# Patient Record
Sex: FEMALE | Race: ASIAN | NOT HISPANIC OR LATINO | URBAN - METROPOLITAN AREA
[De-identification: names, ages, dates, MRNs, and addresses within clinical notes are randomized per-mention and may not be internally consistent; named-entity substitution may affect disease eponyms.]

---

## 2021-12-25 ENCOUNTER — EMERGENCY (EMERGENCY)
Facility: HOSPITAL | Age: 41
LOS: 1 days | Discharge: HOME | End: 2021-12-25
Attending: EMERGENCY MEDICINE | Admitting: EMERGENCY MEDICINE
Payer: MEDICAID

## 2021-12-25 VITALS
RESPIRATION RATE: 18 BRPM | SYSTOLIC BLOOD PRESSURE: 151 MMHG | TEMPERATURE: 97 F | DIASTOLIC BLOOD PRESSURE: 94 MMHG | OXYGEN SATURATION: 100 % | HEART RATE: 75 BPM

## 2021-12-25 PROCEDURE — 99284 EMERGENCY DEPT VISIT MOD MDM: CPT

## 2021-12-25 RX ORDER — EPINEPHRINE 0.3 MG/.3ML
0.3 INJECTION INTRAMUSCULAR; SUBCUTANEOUS ONCE
Refills: 0 | Status: DISCONTINUED | OUTPATIENT
Start: 2021-12-25 | End: 2021-12-25

## 2021-12-25 NOTE — ED ADULT TRIAGE NOTE - CHIEF COMPLAINT QUOTE
pt c/o throat tightness x2 days. Pt states symptoms started after taking azithromycin and had similar symptoms last time she took same medicine. Pt speaking in full sentences breathing unlabored but intermittently gasping/heaving. Denies nausea or hest pain. Pt covid+ 12/14

## 2021-12-25 NOTE — ED ADULT NURSE NOTE - OBJECTIVE STATEMENT
42 yo F AAOx4 received to Tr-A, NAD on exam, resps even unlabored on RA, pt states shes been taking azithromycin for an ear infection and it makes her throat feel like its closing, no signs of compromise on exam, pt randomly gasps for air/burps during speech but remains clear and coherent, MD Camara at bedside for eval, pending further orders

## 2021-12-26 VITALS
DIASTOLIC BLOOD PRESSURE: 71 MMHG | SYSTOLIC BLOOD PRESSURE: 127 MMHG | HEART RATE: 71 BPM | RESPIRATION RATE: 16 BRPM | OXYGEN SATURATION: 100 % | TEMPERATURE: 98 F

## 2021-12-26 LAB
ALBUMIN SERPL ELPH-MCNC: 4.4 G/DL — SIGNIFICANT CHANGE UP (ref 3.3–5)
ALP SERPL-CCNC: 76 U/L — SIGNIFICANT CHANGE UP (ref 40–120)
ALT FLD-CCNC: 31 U/L — SIGNIFICANT CHANGE UP (ref 4–33)
ANION GAP SERPL CALC-SCNC: 12 MMOL/L — SIGNIFICANT CHANGE UP (ref 7–14)
AST SERPL-CCNC: 37 U/L — HIGH (ref 4–32)
BASOPHILS # BLD AUTO: 0.05 K/UL — SIGNIFICANT CHANGE UP (ref 0–0.2)
BASOPHILS NFR BLD AUTO: 0.8 % — SIGNIFICANT CHANGE UP (ref 0–2)
BILIRUB SERPL-MCNC: 0.3 MG/DL — SIGNIFICANT CHANGE UP (ref 0.2–1.2)
BUN SERPL-MCNC: 10 MG/DL — SIGNIFICANT CHANGE UP (ref 7–23)
CALCIUM SERPL-MCNC: 8.8 MG/DL — SIGNIFICANT CHANGE UP (ref 8.4–10.5)
CHLORIDE SERPL-SCNC: 105 MMOL/L — SIGNIFICANT CHANGE UP (ref 98–107)
CO2 SERPL-SCNC: 22 MMOL/L — SIGNIFICANT CHANGE UP (ref 22–31)
CREAT SERPL-MCNC: 0.58 MG/DL — SIGNIFICANT CHANGE UP (ref 0.5–1.3)
EOSINOPHIL # BLD AUTO: 0.06 K/UL — SIGNIFICANT CHANGE UP (ref 0–0.5)
EOSINOPHIL NFR BLD AUTO: 1 % — SIGNIFICANT CHANGE UP (ref 0–6)
FLUAV AG NPH QL: SIGNIFICANT CHANGE UP
FLUBV AG NPH QL: SIGNIFICANT CHANGE UP
GLUCOSE SERPL-MCNC: 111 MG/DL — HIGH (ref 70–99)
HCT VFR BLD CALC: 40.3 % — SIGNIFICANT CHANGE UP (ref 34.5–45)
HGB BLD-MCNC: 13 G/DL — SIGNIFICANT CHANGE UP (ref 11.5–15.5)
IANC: 2.76 K/UL — SIGNIFICANT CHANGE UP (ref 1.5–8.5)
IMM GRANULOCYTES NFR BLD AUTO: 0.3 % — SIGNIFICANT CHANGE UP (ref 0–1.5)
LYMPHOCYTES # BLD AUTO: 2.79 K/UL — SIGNIFICANT CHANGE UP (ref 1–3.3)
LYMPHOCYTES # BLD AUTO: 45.4 % — HIGH (ref 13–44)
MAGNESIUM SERPL-MCNC: 2.2 MG/DL — SIGNIFICANT CHANGE UP (ref 1.6–2.6)
MCHC RBC-ENTMCNC: 26.9 PG — LOW (ref 27–34)
MCHC RBC-ENTMCNC: 32.3 GM/DL — SIGNIFICANT CHANGE UP (ref 32–36)
MCV RBC AUTO: 83.3 FL — SIGNIFICANT CHANGE UP (ref 80–100)
MONOCYTES # BLD AUTO: 0.47 K/UL — SIGNIFICANT CHANGE UP (ref 0–0.9)
MONOCYTES NFR BLD AUTO: 7.6 % — SIGNIFICANT CHANGE UP (ref 2–14)
NEUTROPHILS # BLD AUTO: 2.76 K/UL — SIGNIFICANT CHANGE UP (ref 1.8–7.4)
NEUTROPHILS NFR BLD AUTO: 44.9 % — SIGNIFICANT CHANGE UP (ref 43–77)
NRBC # BLD: 0 /100 WBCS — SIGNIFICANT CHANGE UP
NRBC # FLD: 0 K/UL — SIGNIFICANT CHANGE UP
PHOSPHATE SERPL-MCNC: 2.7 MG/DL — SIGNIFICANT CHANGE UP (ref 2.5–4.5)
PLATELET # BLD AUTO: 166 K/UL — SIGNIFICANT CHANGE UP (ref 150–400)
POTASSIUM SERPL-MCNC: 4.6 MMOL/L — SIGNIFICANT CHANGE UP (ref 3.5–5.3)
POTASSIUM SERPL-SCNC: 4.6 MMOL/L — SIGNIFICANT CHANGE UP (ref 3.5–5.3)
PROT SERPL-MCNC: 7.2 G/DL — SIGNIFICANT CHANGE UP (ref 6–8.3)
RBC # BLD: 4.84 M/UL — SIGNIFICANT CHANGE UP (ref 3.8–5.2)
RBC # FLD: 13.7 % — SIGNIFICANT CHANGE UP (ref 10.3–14.5)
RSV RNA NPH QL NAA+NON-PROBE: SIGNIFICANT CHANGE UP
SARS-COV-2 RNA SPEC QL NAA+PROBE: DETECTED
SODIUM SERPL-SCNC: 139 MMOL/L — SIGNIFICANT CHANGE UP (ref 135–145)
WBC # BLD: 6.15 K/UL — SIGNIFICANT CHANGE UP (ref 3.8–10.5)
WBC # FLD AUTO: 6.15 K/UL — SIGNIFICANT CHANGE UP (ref 3.8–10.5)

## 2021-12-26 PROCEDURE — 71045 X-RAY EXAM CHEST 1 VIEW: CPT | Mod: 26

## 2021-12-26 PROCEDURE — 70360 X-RAY EXAM OF NECK: CPT | Mod: 26

## 2021-12-26 RX ORDER — DEXAMETHASONE 0.5 MG/5ML
6 ELIXIR ORAL ONCE
Refills: 0 | Status: COMPLETED | OUTPATIENT
Start: 2021-12-26 | End: 2021-12-26

## 2021-12-26 RX ORDER — FAMOTIDINE 10 MG/ML
20 INJECTION INTRAVENOUS ONCE
Refills: 0 | Status: COMPLETED | OUTPATIENT
Start: 2021-12-26 | End: 2021-12-26

## 2021-12-26 RX ORDER — SODIUM CHLORIDE 9 MG/ML
1000 INJECTION INTRAMUSCULAR; INTRAVENOUS; SUBCUTANEOUS ONCE
Refills: 0 | Status: COMPLETED | OUTPATIENT
Start: 2021-12-26 | End: 2021-12-26

## 2021-12-26 RX ORDER — DIPHENHYDRAMINE HCL 50 MG
50 CAPSULE ORAL ONCE
Refills: 0 | Status: COMPLETED | OUTPATIENT
Start: 2021-12-26 | End: 2021-12-26

## 2021-12-26 RX ADMIN — Medication 6 MILLIGRAM(S): at 00:33

## 2021-12-26 RX ADMIN — FAMOTIDINE 20 MILLIGRAM(S): 10 INJECTION INTRAVENOUS at 00:33

## 2021-12-26 RX ADMIN — SODIUM CHLORIDE 1000 MILLILITER(S): 9 INJECTION INTRAMUSCULAR; INTRAVENOUS; SUBCUTANEOUS at 00:32

## 2021-12-26 RX ADMIN — Medication 50 MILLIGRAM(S): at 00:33

## 2021-12-26 RX ADMIN — Medication 1 MILLIGRAM(S): at 01:37

## 2021-12-26 NOTE — ED PROVIDER NOTE - PROGRESS NOTE DETAILS
Sister over the phone stating "she doesn't need blood work, she just needs help". Pt later agreeable to blood work. Will trial meds and reassess. Obi Marti, EM PGY3 ENT consulted - no findings on scope. Pt no longer gasping after ativan. Likely anxiety symptoms. Will monitor and give SLP follow up per ENT reccs. LOGAN Toussaint PGY3

## 2021-12-26 NOTE — ED PROVIDER NOTE - CLINICAL SUMMARY MEDICAL DECISION MAKING FREE TEXT BOX
41F presenting with CC of trouble breathing. On exam VSS w normal RR and O2 sat. Pt appears to be anxious. will obtain necessary labs and imaging and trial meds, reassess. LOAGN Toussaint PGY3

## 2021-12-26 NOTE — CONSULT NOTE ADULT - SUBJECTIVE AND OBJECTIVE BOX
HPI:  Patient is a 41y Female with no PMH p/w throat discomfort, episodes of gasping for air. Reports she was diagnosed with ear infection 3 days ago at OSH, was given azithromycin. Also was positive for COVID. She returned to OSH and was admitted o/n for SOB. Patient dced home but continues to have sx. In ED pt noted to have intermittent gasping sounds, although able to speak in full sentences. Reports intermittent throat tightness and the feeling as if something is stuck/squeezing her throat. No fevers, chest pain. Pt received benadryl, epi, pepcid, decadron, benzos in ED. Upon eval by ENT sx resolved. She reports similar sx one year ago, also in the setting of abx use.    Physical Exam  T(C): 36.1 (12-25-21 @ 22:39), Max: 36.1 (12-25-21 @ 22:39)  HR: 75 (12-25-21 @ 22:39) (75 - 75)  BP: 151/94 (12-25-21 @ 22:39) (151/94 - 151/94)  RR: 18 (12-25-21 @ 22:39) (18 - 18)  SpO2: 100% (12-25-21 @ 22:39) (100% - 100%)  General: NAD, A+Ox3  Resp: No respiratory distress, stridor, or stertor  Voice quality: normal  Face:  Symmetric without masses or lesions  OU: EOMI  OC/OP: tongue normal, floor of mouth wnl, no masses or lesions, OP clear  Neck: soft/flat, no LAD  CNII-XII intact    LARYNGOSCOPY EXAM:     Verbal consent was obtained from patient prior to procedure.    Indication: throat discomfort    Anesthesia: Afrin spray was applied to the nasal cavities.    Flexible laryngoscopy was performed and revealed the following:    Nasopharynx had no mass or exudate.    Base of tongue was symmetric and not enlarged.    Vallecula was clear    Epiglottis, both aryepiglottic folds and both false vocal folds were normal    Arytenoids both without edema and erythema     True vocal folds were fully mobile and without lesions.     Post cricoid area was clear    Interarytenoid edema was absent    The patient tolerated the procedure well.      A/P: 41y Female no PMH p/w throat discomfort and subjective difficulty breathing. PE and FOE wnl, although paradoxical VC motion cannot be ruled out as pt not having episode at time of evaluation.   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

## 2021-12-26 NOTE — ED PROVIDER NOTE - OBJECTIVE STATEMENT
41F w no significant pmhx presenting with CC of gasping for air. Patient states that she is fully covid vaccinated and got the booster on Monday. Started having ear pain on thursday - went to [facility] was diagnosed w ear infection and started on azithromycin. Also tested positive for covid. Pt went back to [facility] yesterday w overnight stay for monitoring for SOB. Dced home without any actionable findings at [facility] but problem has not resolved. No f/c, cough, n/v, chest pain. Pt tearful during encounter but not dyspneic - able to speak in full sentences.

## 2021-12-26 NOTE — ED PROVIDER NOTE - PHYSICAL EXAMINATION
Const: Well-nourished, Well-developed, appearing stated age. Distressed.   Eyes: no conjunctival injection, and symmetrical lids.  HEENT: Head NCAT, no lesions. Atraumatic external nose and ears.   Neck: Symmetric, trachea midline.   CVS: +S1/S2, Radial and DP pulses 2+ b/l.   RESP: Unlabored respiratory effort. Clear to auscultation bilaterally.  GI: Nontender/Nondistended, No CVA tenderness b/l.   MSK: Normocephalic/Atraumatic, Lower Extremities w/o edema b/l.   Skin: Warm, dry and intact.   Neuro: Fluent Speech. Motor & Sensation grossly intact.  Psych: Awake, Alert, & Oriented (AAO) x3. Appropriate mood and affect. Const: Well-nourished, Well-developed, appearing stated age. Distressed.   Eyes: no conjunctival injection, and symmetrical lids.  HEENT: Head NCAT, no lesions. Atraumatic external nose and ears. No oropharyngeal edema, uvula midline.  Neck: Symmetric, trachea midline. No stridor.   CVS: +S1/S2, Radial and DP pulses 2+ b/l.   RESP: Unlabored respiratory effort. Clear to auscultation bilaterally.  GI: Nontender/Nondistended, No CVA tenderness b/l.   MSK: Normocephalic/Atraumatic, Lower Extremities w/o edema b/l.   Skin: Warm, dry and intact.   Neuro: Fluent Speech. Motor & Sensation grossly intact.  Psych: Awake, Alert, & Oriented (AAO) x3. Appropriate mood and affect.

## 2021-12-26 NOTE — ED PROVIDER NOTE - PATIENT PORTAL LINK FT
You can access the FollowMyHealth Patient Portal offered by [facility] by registering at the following website: [email or web address]. By joining "[employer]’s FollowMyHealth portal, you will also be able to view your health information using other applications (apps) compatible with our system.

## 2021-12-26 NOTE — ED PROVIDER NOTE - ATTENDING CONTRIBUTION TO CARE
agree with resident note    "41F w no significant pmhx presenting with CC of gasping for air. Patient states that she is fully covid vaccinated and got the booster on Monday. Started having ear pain on thursday - went to [facility] was diagnosed w ear infection and started on azithromycin. Also tested positive for covid. Pt went back to [facility] yesterday w overnight stay for monitoring for SOB. Dced home without any actionable findings at [facility] but problem has not resolved. No f/c, cough, n/v, chest pain. Pt tearful during encounter but not dyspneic - able to speak in full sentences."    pt at times gasping for air.  Then speaking clearly with no evidence of resp compromise.  Speaking with sister states this has been ongoing and worse since yesterday.    PE: well appearing and then gasping for air; no trismus; no stridor; CTAB/L; s1 s2 no m/r/g abd soft/NT/ND    Imp: globus?  initially treated for allergic reaction; hx not consistent; will still have ENT scope; treat with ativan for possible anxiety cause

## 2021-12-26 NOTE — ED PROVIDER NOTE - NSFOLLOWUPINSTRUCTIONS_ED_ALL_ED_FT
Please see attached information on anxiety.     Return to the ER for new or worsening symptoms.      your medications and take as directed.     The number for our walk in clinic is these episodes return is [phone].

## 2021-12-26 NOTE — ED PROVIDER NOTE - NS ED ROS FT
CONST: no fevers, no chills, no trauma  EYES: no pain, no blurry vision   ENT: no sore throat, no epistaxis, no rhinorrhea  CV: no chest pain, no palpitations, no orthopnea, no extremity pain or swelling  RESP: + shortness of breath, no cough, no sputum, no pleurisy, no wheezing  ABD: no abdominal pain, no nausea, no vomiting, no diarrhea, no black or bloody stool  : no dysuria, no hematuria, no frequency, no urgency  MSK: no back pain, no neck pain, no extremity pain  NEURO: no headache, no sensory disturbances, no focal weakness, no dizziness  HEME: no easy bleeding or bruising  SKIN: no diaphoresis, no rash

## 2021-12-26 NOTE — ED ADULT NURSE REASSESSMENT NOTE - NS ED NURSE REASSESS COMMENT FT1
pt only gasping/burping when at rest (not during speech), pt sister remains on phone stating "you guys need to help her, her throat is closing," no signs of distress/compromise/edema, medicated with ativan for anxiety, will reassess

## 2022-12-14 ENCOUNTER — EMERGENCY (EMERGENCY)
Facility: HOSPITAL | Age: 42
LOS: 1 days | Discharge: HOME | End: 2022-12-14
Attending: EMERGENCY MEDICINE | Admitting: EMERGENCY MEDICINE
Payer: COMMERCIAL

## 2022-12-14 VITALS
TEMPERATURE: 98 F | OXYGEN SATURATION: 100 % | DIASTOLIC BLOOD PRESSURE: 76 MMHG | WEIGHT: 171.08 LBS | SYSTOLIC BLOOD PRESSURE: 112 MMHG | HEART RATE: 74 BPM | RESPIRATION RATE: 18 BRPM

## 2022-12-14 VITALS
TEMPERATURE: 98 F | SYSTOLIC BLOOD PRESSURE: 110 MMHG | RESPIRATION RATE: 16 BRPM | OXYGEN SATURATION: 100 % | DIASTOLIC BLOOD PRESSURE: 60 MMHG | HEART RATE: 71 BPM

## 2022-12-14 PROBLEM — Z78.9 OTHER SPECIFIED HEALTH STATUS: Chronic | Status: ACTIVE | Noted: 2021-12-26

## 2022-12-14 LAB
ALBUMIN SERPL ELPH-MCNC: 4 G/DL — SIGNIFICANT CHANGE UP (ref 3.3–5)
ALP SERPL-CCNC: 89 U/L — SIGNIFICANT CHANGE UP (ref 40–120)
ALT FLD-CCNC: 56 U/L — SIGNIFICANT CHANGE UP (ref 12–78)
ANION GAP SERPL CALC-SCNC: 4 MMOL/L — LOW (ref 5–17)
AST SERPL-CCNC: 24 U/L — SIGNIFICANT CHANGE UP (ref 15–37)
BASOPHILS # BLD AUTO: 0.1 K/UL — SIGNIFICANT CHANGE UP (ref 0–0.2)
BASOPHILS NFR BLD AUTO: 1.2 % — SIGNIFICANT CHANGE UP (ref 0–2)
BILIRUB SERPL-MCNC: 0.2 MG/DL — SIGNIFICANT CHANGE UP (ref 0.2–1.2)
BUN SERPL-MCNC: 8 MG/DL — SIGNIFICANT CHANGE UP (ref 7–23)
CALCIUM SERPL-MCNC: 8.9 MG/DL — SIGNIFICANT CHANGE UP (ref 8.5–10.1)
CHLORIDE SERPL-SCNC: 109 MMOL/L — HIGH (ref 96–108)
CO2 SERPL-SCNC: 29 MMOL/L — SIGNIFICANT CHANGE UP (ref 22–31)
CREAT SERPL-MCNC: 0.51 MG/DL — SIGNIFICANT CHANGE UP (ref 0.5–1.3)
EGFR: 121 ML/MIN/1.73M2 — SIGNIFICANT CHANGE UP
EOSINOPHIL # BLD AUTO: 0.17 K/UL — SIGNIFICANT CHANGE UP (ref 0–0.5)
EOSINOPHIL NFR BLD AUTO: 2 % — SIGNIFICANT CHANGE UP (ref 0–6)
GLUCOSE SERPL-MCNC: 110 MG/DL — HIGH (ref 70–99)
HCT VFR BLD CALC: 40.3 % — SIGNIFICANT CHANGE UP (ref 34.5–45)
HGB BLD-MCNC: 13.8 G/DL — SIGNIFICANT CHANGE UP (ref 11.5–15.5)
IMM GRANULOCYTES NFR BLD AUTO: 0.5 % — SIGNIFICANT CHANGE UP (ref 0–0.9)
LYMPHOCYTES # BLD AUTO: 2.29 K/UL — SIGNIFICANT CHANGE UP (ref 1–3.3)
LYMPHOCYTES # BLD AUTO: 27.6 % — SIGNIFICANT CHANGE UP (ref 13–44)
MCHC RBC-ENTMCNC: 28 PG — SIGNIFICANT CHANGE UP (ref 27–34)
MCHC RBC-ENTMCNC: 34.2 GM/DL — SIGNIFICANT CHANGE UP (ref 32–36)
MCV RBC AUTO: 81.7 FL — SIGNIFICANT CHANGE UP (ref 80–100)
MONOCYTES # BLD AUTO: 0.51 K/UL — SIGNIFICANT CHANGE UP (ref 0–0.9)
MONOCYTES NFR BLD AUTO: 6.1 % — SIGNIFICANT CHANGE UP (ref 2–14)
NEUTROPHILS # BLD AUTO: 5.19 K/UL — SIGNIFICANT CHANGE UP (ref 1.8–7.4)
NEUTROPHILS NFR BLD AUTO: 62.6 % — SIGNIFICANT CHANGE UP (ref 43–77)
NRBC # BLD: 0 /100 WBCS — SIGNIFICANT CHANGE UP (ref 0–0)
PLATELET # BLD AUTO: 237 K/UL — SIGNIFICANT CHANGE UP (ref 150–400)
POTASSIUM SERPL-MCNC: 4 MMOL/L — SIGNIFICANT CHANGE UP (ref 3.5–5.3)
POTASSIUM SERPL-SCNC: 4 MMOL/L — SIGNIFICANT CHANGE UP (ref 3.5–5.3)
PROT SERPL-MCNC: 7.3 G/DL — SIGNIFICANT CHANGE UP (ref 6–8.3)
RBC # BLD: 4.93 M/UL — SIGNIFICANT CHANGE UP (ref 3.8–5.2)
RBC # FLD: 13.2 % — SIGNIFICANT CHANGE UP (ref 10.3–14.5)
SODIUM SERPL-SCNC: 142 MMOL/L — SIGNIFICANT CHANGE UP (ref 135–145)
TROPONIN I, HIGH SENSITIVITY RESULT: 4.2 NG/L — SIGNIFICANT CHANGE UP
WBC # BLD: 8.3 K/UL — SIGNIFICANT CHANGE UP (ref 3.8–10.5)
WBC # FLD AUTO: 8.3 K/UL — SIGNIFICANT CHANGE UP (ref 3.8–10.5)

## 2022-12-14 PROCEDURE — 70486 CT MAXILLOFACIAL W/O DYE: CPT | Mod: 26,MA

## 2022-12-14 PROCEDURE — 70450 CT HEAD/BRAIN W/O DYE: CPT | Mod: MA

## 2022-12-14 PROCEDURE — 71045 X-RAY EXAM CHEST 1 VIEW: CPT | Mod: 26

## 2022-12-14 PROCEDURE — 84484 ASSAY OF TROPONIN QUANT: CPT

## 2022-12-14 PROCEDURE — 99284 EMERGENCY DEPT VISIT MOD MDM: CPT | Mod: 25

## 2022-12-14 PROCEDURE — 96374 THER/PROPH/DIAG INJ IV PUSH: CPT | Mod: XU

## 2022-12-14 PROCEDURE — 70496 CT ANGIOGRAPHY HEAD: CPT | Mod: MA

## 2022-12-14 PROCEDURE — 70498 CT ANGIOGRAPHY NECK: CPT | Mod: 26,MA

## 2022-12-14 PROCEDURE — 70486 CT MAXILLOFACIAL W/O DYE: CPT | Mod: MA

## 2022-12-14 PROCEDURE — 70498 CT ANGIOGRAPHY NECK: CPT | Mod: MA

## 2022-12-14 PROCEDURE — 36415 COLL VENOUS BLD VENIPUNCTURE: CPT

## 2022-12-14 PROCEDURE — 70496 CT ANGIOGRAPHY HEAD: CPT | Mod: 26,MA

## 2022-12-14 PROCEDURE — 99285 EMERGENCY DEPT VISIT HI MDM: CPT

## 2022-12-14 PROCEDURE — 96361 HYDRATE IV INFUSION ADD-ON: CPT

## 2022-12-14 PROCEDURE — 80053 COMPREHEN METABOLIC PANEL: CPT

## 2022-12-14 PROCEDURE — 85025 COMPLETE CBC W/AUTO DIFF WBC: CPT

## 2022-12-14 PROCEDURE — 71045 X-RAY EXAM CHEST 1 VIEW: CPT

## 2022-12-14 RX ORDER — SODIUM CHLORIDE 9 MG/ML
1000 INJECTION INTRAMUSCULAR; INTRAVENOUS; SUBCUTANEOUS ONCE
Refills: 0 | Status: COMPLETED | OUTPATIENT
Start: 2022-12-14 | End: 2022-12-14

## 2022-12-14 RX ORDER — OXYCODONE AND ACETAMINOPHEN 5; 325 MG/1; MG/1
1 TABLET ORAL
Qty: 15 | Refills: 0
Start: 2022-12-14 | End: 2022-12-16

## 2022-12-14 RX ORDER — KETOROLAC TROMETHAMINE 30 MG/ML
30 SYRINGE (ML) INJECTION ONCE
Refills: 0 | Status: DISCONTINUED | OUTPATIENT
Start: 2022-12-14 | End: 2022-12-14

## 2022-12-14 RX ADMIN — SODIUM CHLORIDE 1000 MILLILITER(S): 9 INJECTION INTRAMUSCULAR; INTRAVENOUS; SUBCUTANEOUS at 16:42

## 2022-12-14 RX ADMIN — Medication 30 MILLIGRAM(S): at 15:41

## 2022-12-14 RX ADMIN — SODIUM CHLORIDE 1000 MILLILITER(S): 9 INJECTION INTRAMUSCULAR; INTRAVENOUS; SUBCUTANEOUS at 15:41

## 2022-12-14 NOTE — ED PROVIDER NOTE - PATIENT PORTAL LINK FT
You can access the FollowMyHealth Patient Portal offered by [facility] by registering at the following website: [email or web address]. By joining [employer]’s FollowMyHealth portal, you will also be able to view your health information using other applications (apps) compatible with our system.

## 2022-12-14 NOTE — ED PROVIDER NOTE - CLINICAL SUMMARY MEDICAL DECISION MAKING FREE TEXT BOX
Patient is a 42-year-old female with 15 days of right-sided facial pressure and pain that radiate somewhat to the right side of her head and scalp area.  Patient did have the flu last month.  No fever no chills no nausea no vomiting.  No visual changes.  No recent dental work.  No tooth ache.  Patient does feel slightly congested.  Patient was seen by PCP earlier today sent here for further evaluation of the concern the patient may be having a stroke.  Case will require thorough evaluation labs as well as CT scan of the head and maxillofacial CT to rule out process in maxillary sinus region.

## 2022-12-14 NOTE — ED ADULT NURSE NOTE - OBJECTIVE STATEMENT
Presents to ER with right sided facial pain and swelling x 15 days.  Pt states she developed symptoms after she had the flu.  Denies any fevers or chills.

## 2022-12-14 NOTE — ED PROVIDER NOTE - ENMT, MLM
Airway patent, TM's normal in appearance. Throat has no vesicles, no oropharyngeal exudates and uvula is midline. right sided frontal and maxillary sinus tenderness to palpation. no dental swelling or tenderness. full jaw ROM. no obvious facial swelling on exam

## 2022-12-14 NOTE — ED PROVIDER NOTE - OBJECTIVE STATEMENT
42-year-old female with history of hypertension presents with right-sided facial pain and facial numbness for the past 15 days.  Also reports sinus congestion for the past couple weeks.  Usually gets sinus congestion yearly around this time.  However, states has never had numbness and subjective swelling with previous sinus congestion.  States symptoms progressively seems to be getting worse.  No symptoms on left side.  Denies any one-sided weakness, slurred speech, dizziness, or confusion.  Has had slight intermittent chest pain earlier today, no chest pain at this time.  No shortness of breath.  No problems walking.  Was seen by primary care doctor and sent to emergency room for further evaluation  PCP Stephanie Flood

## 2022-12-14 NOTE — ED PROVIDER NOTE - NSFOLLOWUPINSTRUCTIONS_ED_ALL_ED_FT
Warm moist compresses to sinuses  Flonase nasal spray over-the-counter  Antihistamine such as Zyrtec or Allegra over-the-counter  Saline rinses over-the-counter  Tylenol or Motrin over-the-counter for pain and discomfort  Percocet to take for severe pain  Augmentin twice a day for 10 days  Prednisone once a day for next 5 days  Have close follow-up with ENT, referrals provided if needed      Sinusitis, Adult       Sinusitis is soreness and swelling (inflammation) of your sinuses. Sinuses are hollow spaces in the bones around your face. They are located:  •Around your eyes.      •In the middle of your forehead.      •Behind your nose.      •In your cheekbones.      Your sinuses and nasal passages are lined with a fluid called mucus. Mucus drains out of your sinuses. Swelling can trap mucus in your sinuses. This lets germs (bacteria, virus, or fungus) grow, which leads to infection. Most of the time, this condition is caused by a virus.      What are the causes?    This condition is caused by:  •Allergies.       •Asthma.       •Germs.      •Things that block your nose or sinuses.      •Growths in the nose (nasal polyps).      •Chemicals or irritants in the air.      •Fungus (rare).        What increases the risk?    You are more likely to develop this condition if:  •You have a weak body defense system (immune system).      •You do a lot of swimming or diving.      •You use nasal sprays too much.      •You smoke.        What are the signs or symptoms?    The main symptoms of this condition are pain and a feeling of pressure around the sinuses. Other symptoms include:  •Stuffy nose (congestion).      •Runny nose (drainage).      •Swelling and warmth in the sinuses.      •Headache.       •Toothache.      •A cough that may get worse at night.      •Mucus that collects in the throat or the back of the nose (postnasal drip).      •Being unable to smell and taste.      •Being very tired (fatigue).      •A fever.      •Sore throat.      •Bad breath.         How is this diagnosed?    This condition is diagnosed based on:  •Your symptoms.       •Your medical history.       •A physical exam.     •Tests to find out if your condition is short-term (acute) or long-term (chronic). Your doctor may:  •Check your nose for growths (polyps).      •Check your sinuses using a tool that has a light (endoscope).      •Check for allergies or germs.      •Do imaging tests, such as an MRI or CT scan.          How is this treated?    Treatment for this condition depends on the cause and whether it is short-term or long-term.•If caused by a virus, your symptoms should go away on their own within 10 days. You may be given medicines to relieve symptoms. They include:  •Medicines that shrink swollen tissue in the nose.       •Medicines that treat allergies (antihistamines).       •A spray that treats swelling of the nostrils.       •Rinses that help get rid of thick mucus in your nose (nasal saline washes).       •If caused by bacteria, your doctor may wait to see if you will get better without treatment. You may be given antibiotic medicine if you have:  •A very bad infection.      •A weak body defense system.        •If caused by growths in the nose, you may need to have surgery.        Follow these instructions at home:    Medicines     •Take, use, or apply over-the-counter and prescription medicines only as told by your doctor. These may include nasal sprays.      •If you were prescribed an antibiotic medicine, take it as told by your doctor. Do not stop taking the antibiotic even if you start to feel better.        Hydrate and humidify      •Drink enough water to keep your pee (urine) pale yellow.      •Use a cool mist humidifier to keep the humidity level in your home above 50%.      •Breathe in steam for 10–15 minutes, 3–4 times a day, or as told by your doctor. You can do this in the bathroom while a hot shower is running.      •Try not to spend time in cool or dry air.      Rest     •Rest as much as you can.      •Sleep with your head raised (elevated).      •Make sure you get enough sleep each night.        General instructions      •Put a warm, moist washcloth on your face 3–4 times a day, or as often as told by your doctor. This will help with discomfort.      •Wash your hands often with soap and water. If there is no soap and water, use hand .      • Do not smoke. Avoid being around people who are smoking (secondhand smoke).      •Keep all follow-up visits as told by your doctor. This is important.        Contact a doctor if:    •You have a fever.      •Your symptoms get worse.      •Your symptoms do not get better within 10 days.        Get help right away if:    •You have a very bad headache.      •You cannot stop throwing up (vomiting).      •You have very bad pain or swelling around your face or eyes.      •You have trouble seeing.      •You feel confused.      •Your neck is stiff.      •You have trouble breathing.        Summary    •Sinusitis is swelling of your sinuses. Sinuses are hollow spaces in the bones around your face.      •This condition is caused by tissues in your nose that become inflamed or swollen. This traps germs. These can lead to infection.      •If you were prescribed an antibiotic medicine, take it as told by your doctor. Do not stop taking it even if you start to feel better.      •Keep all follow-up visits as told by your doctor. This is important.      This information is not intended to replace advice given to you by your health care provider. Make sure you discuss any questions you have with your health care provider.

## 2022-12-14 NOTE — ED PROVIDER NOTE - PROGRESS NOTE DETAILS
Patient stable.  CT and CTA results discussed with patient.  No evidence of ICH, mass, or CVA.  CT maxillofacial shows sinusitis.  Will prescribe antibiotics, steroids, and pain medications.  Recommend close ENT follow-up.  Recommend warm moist compresses, antihistamine over-the-counter, flonase, saline rinses.  Have considered tension headache, cluster headache, trigeminal neuralgia, migraines.  Symptoms likely consistent with sinusitis.

## 2022-12-14 NOTE — ED PROVIDER NOTE - ATTENDING APP SHARED VISIT CONTRIBUTION OF CARE
Examination reveals a well-developed well-nourished female lying comfortably on the stretcher in no acute distress with mild fullness over the right maxillary region with no tenderness no redness no warmth.  No submandibular adenopathy.  Nontender parotid gland region.  Pharynx is normal.  No gross dental pathology.  I agree with plan of management outlined by PA.

## 2022-12-14 NOTE — ED PROVIDER NOTE - PROVIDER TOKENS
PROVIDER:[TOKEN:[[id number]],FOLLOWUP:[1-3 Days]],PROVIDER:[TOKEN:[[id number]],FOLLOWUP:[1-3 Days]]

## 2022-12-14 NOTE — ED PROVIDER NOTE - CARE PROVIDER_API CALL
Efrain Herrera)  Otolaryngology  [address], [address]  [place] [address]  Phone: [phone]  Fax: [phone]  Follow Up Time: 1-3 Days    Dayo Harrison  OTOLARYNGOLOGY  [address] [place] [address], [address]  [place] [address]  Phone: [phone]  Fax: [phone]  Follow Up Time: 1-3 Days

## 2023-09-19 NOTE — ED ADULT TRIAGE NOTE - INTERNATIONAL TRAVEL
Render Risk Assessment In Note?: no Detail Level: Simple No Comment: Biopsy proven cystic squamous\\nHealing well today \\n\\nWill continue to monitor \\nWill Re-biopsy if worsen

## 2025-01-06 ENCOUNTER — APPOINTMENT (OUTPATIENT)
Dept: ORTHOPEDIC SURGERY | Facility: CLINIC | Age: 45
End: 2025-01-06

## 2025-03-07 ENCOUNTER — EMERGENCY (EMERGENCY)
Facility: HOSPITAL | Age: 45
LOS: 1 days | Discharge: HOME | End: 2025-03-07
Attending: STUDENT IN AN ORGANIZED HEALTH CARE EDUCATION/TRAINING PROGRAM | Admitting: STUDENT IN AN ORGANIZED HEALTH CARE EDUCATION/TRAINING PROGRAM
Payer: COMMERCIAL

## 2025-03-07 VITALS
HEART RATE: 95 BPM | HEIGHT: 63 IN | DIASTOLIC BLOOD PRESSURE: 97 MMHG | SYSTOLIC BLOOD PRESSURE: 161 MMHG | WEIGHT: 179.9 LBS | RESPIRATION RATE: 18 BRPM | OXYGEN SATURATION: 100 % | TEMPERATURE: 98 F

## 2025-03-07 LAB
ALBUMIN SERPL ELPH-MCNC: 3.3 G/DL — SIGNIFICANT CHANGE UP (ref 3.3–5)
ALP SERPL-CCNC: 66 U/L — SIGNIFICANT CHANGE UP (ref 40–120)
ALT FLD-CCNC: 34 U/L — SIGNIFICANT CHANGE UP (ref 12–78)
ANION GAP SERPL CALC-SCNC: 8 MMOL/L — SIGNIFICANT CHANGE UP (ref 5–17)
APPEARANCE UR: CLEAR — SIGNIFICANT CHANGE UP
AST SERPL-CCNC: 49 U/L — HIGH (ref 15–37)
BASOPHILS # BLD AUTO: 0.09 K/UL — SIGNIFICANT CHANGE UP (ref 0–0.2)
BASOPHILS NFR BLD AUTO: 0.9 % — SIGNIFICANT CHANGE UP (ref 0–2)
BILIRUB SERPL-MCNC: 0.4 MG/DL — SIGNIFICANT CHANGE UP (ref 0.2–1.2)
BILIRUB UR-MCNC: NEGATIVE — SIGNIFICANT CHANGE UP
BUN SERPL-MCNC: 7 MG/DL — SIGNIFICANT CHANGE UP (ref 7–23)
CALCIUM SERPL-MCNC: 8.6 MG/DL — SIGNIFICANT CHANGE UP (ref 8.5–10.1)
CHLORIDE SERPL-SCNC: 105 MMOL/L — SIGNIFICANT CHANGE UP (ref 96–108)
CO2 SERPL-SCNC: 27 MMOL/L — SIGNIFICANT CHANGE UP (ref 22–31)
COLOR SPEC: YELLOW — SIGNIFICANT CHANGE UP
CREAT SERPL-MCNC: 0.56 MG/DL — SIGNIFICANT CHANGE UP (ref 0.5–1.3)
DIFF PNL FLD: NEGATIVE — SIGNIFICANT CHANGE UP
EGFR: 117 ML/MIN/1.73M2 — SIGNIFICANT CHANGE UP
EOSINOPHIL # BLD AUTO: 0.2 K/UL — SIGNIFICANT CHANGE UP (ref 0–0.5)
EOSINOPHIL NFR BLD AUTO: 1.9 % — SIGNIFICANT CHANGE UP (ref 0–6)
GLUCOSE SERPL-MCNC: 117 MG/DL — HIGH (ref 70–99)
GLUCOSE UR QL: NEGATIVE MG/DL — SIGNIFICANT CHANGE UP
HCT VFR BLD CALC: 36.3 % — SIGNIFICANT CHANGE UP (ref 34.5–45)
HGB BLD-MCNC: 12.4 G/DL — SIGNIFICANT CHANGE UP (ref 11.5–15.5)
IMM GRANULOCYTES NFR BLD AUTO: 0.8 % — SIGNIFICANT CHANGE UP (ref 0–0.9)
KETONES UR-MCNC: NEGATIVE MG/DL — SIGNIFICANT CHANGE UP
LEUKOCYTE ESTERASE UR-ACNC: ABNORMAL
LYMPHOCYTES # BLD AUTO: 2.56 K/UL — SIGNIFICANT CHANGE UP (ref 1–3.3)
LYMPHOCYTES # BLD AUTO: 24.6 % — SIGNIFICANT CHANGE UP (ref 13–44)
MAGNESIUM SERPL-MCNC: 2 MG/DL — SIGNIFICANT CHANGE UP (ref 1.6–2.6)
MCHC RBC-ENTMCNC: 26.7 PG — LOW (ref 27–34)
MCHC RBC-ENTMCNC: 34.2 G/DL — SIGNIFICANT CHANGE UP (ref 32–36)
MCV RBC AUTO: 78.1 FL — LOW (ref 80–100)
MONOCYTES # BLD AUTO: 0.58 K/UL — SIGNIFICANT CHANGE UP (ref 0–0.9)
MONOCYTES NFR BLD AUTO: 5.6 % — SIGNIFICANT CHANGE UP (ref 2–14)
NEUTROPHILS # BLD AUTO: 6.9 K/UL — SIGNIFICANT CHANGE UP (ref 1.8–7.4)
NEUTROPHILS NFR BLD AUTO: 66.2 % — SIGNIFICANT CHANGE UP (ref 43–77)
NITRITE UR-MCNC: NEGATIVE — SIGNIFICANT CHANGE UP
NRBC BLD AUTO-RTO: 0 /100 WBCS — SIGNIFICANT CHANGE UP (ref 0–0)
PH UR: 6.5 — SIGNIFICANT CHANGE UP (ref 5–8)
PLATELET # BLD AUTO: 244 K/UL — SIGNIFICANT CHANGE UP (ref 150–400)
POTASSIUM SERPL-MCNC: 4.4 MMOL/L — SIGNIFICANT CHANGE UP (ref 3.5–5.3)
POTASSIUM SERPL-SCNC: 4.4 MMOL/L — SIGNIFICANT CHANGE UP (ref 3.5–5.3)
PROT SERPL-MCNC: 6.9 G/DL — SIGNIFICANT CHANGE UP (ref 6–8.3)
PROT UR-MCNC: NEGATIVE MG/DL — SIGNIFICANT CHANGE UP
RBC # BLD: 4.65 M/UL — SIGNIFICANT CHANGE UP (ref 3.8–5.2)
RBC # FLD: 13.3 % — SIGNIFICANT CHANGE UP (ref 10.3–14.5)
SODIUM SERPL-SCNC: 140 MMOL/L — SIGNIFICANT CHANGE UP (ref 135–145)
SP GR SPEC: 1 — LOW (ref 1–1.03)
TSH SERPL-MCNC: 1.75 UIU/ML — SIGNIFICANT CHANGE UP (ref 0.36–3.74)
UROBILINOGEN FLD QL: 0.2 MG/DL — SIGNIFICANT CHANGE UP (ref 0.2–1)
WBC # BLD: 10.41 K/UL — SIGNIFICANT CHANGE UP (ref 3.8–10.5)
WBC # FLD AUTO: 10.41 K/UL — SIGNIFICANT CHANGE UP (ref 3.8–10.5)

## 2025-03-07 RX ORDER — CEFTRIAXONE 500 MG/1
1000 INJECTION, POWDER, FOR SOLUTION INTRAMUSCULAR; INTRAVENOUS ONCE
Refills: 0 | Status: COMPLETED | OUTPATIENT
Start: 2025-03-07 | End: 2025-03-07

## 2025-03-07 RX ORDER — AMLODIPINE BESYLATE 10 MG/1
5 TABLET ORAL ONCE
Refills: 0 | Status: COMPLETED | OUTPATIENT
Start: 2025-03-07 | End: 2025-03-07

## 2025-03-07 RX ORDER — ACETAMINOPHEN 500 MG/5ML
1000 LIQUID (ML) ORAL ONCE
Refills: 0 | Status: COMPLETED | OUTPATIENT
Start: 2025-03-07 | End: 2025-03-07

## 2025-03-07 RX ADMIN — Medication 1000 MILLILITER(S): at 23:14

## 2025-03-07 RX ADMIN — Medication 400 MILLIGRAM(S): at 23:14

## 2025-03-07 RX ADMIN — AMLODIPINE BESYLATE 5 MILLIGRAM(S): 10 TABLET ORAL at 23:14

## 2025-03-07 NOTE — ED PROVIDER NOTE - EKG #1 DATE/TIME
days, 2 tabs once daily for 3 days, 1 tab once daily for 3 days      The patient is to call for any concerns or return if any of the signs or symptoms worsen. The patient is to follow-up with PCP in the next 2-3 days for repeat evaluation repeat assessment or go directly to the emergency department.      SIGNATURE: Vinay Carmichael III, PA-C 07-Mar-2025 23:49

## 2025-03-07 NOTE — ED ADULT TRIAGE NOTE - CHIEF COMPLAINT QUOTE
Pt c/o dizziness x 3-4 days, also found be hypertensive, in urgent care received Clonidine 1mg. Pt now also c/o headache and nausea.

## 2025-03-07 NOTE — ED PROVIDER NOTE - PHYSICAL EXAMINATION
Gen: Awake, Alert, NAD, well appearing  Head:  NC/AT  Eyes:  PERRL, EOMI, Conjunctiva pink, no scleral icterus  ENT:  no exudates, no erythema, uvula midline, TMs clear bilaterally, moist mucus membranes  Neck: supple, nontender, no meningismus, no JVD, trachea midline  Cardiac/CV:  S1 S2, RRR, no murmurs  Respiratory/Pulm:  CTAB, good air movement, normal resp effort, no wheezes/stridor/retractions/rales/rhonchi  Gastrointestinal/Abdomen:  Soft, nontender, nondistended, no rebound/guarding  Ext:  warm, well perfused, moving all extremities spontaneously, no cyanosis, no erythema, no edema, distal pulses intact  Skin: intact, no rash, no petechiae, no ecchymosis  Neuro:  AAOx3, sensation intact, motor 5/5 x 4 extremities, clear speech

## 2025-03-07 NOTE — ED ADULT NURSE NOTE - BREATH SOUNDS, MLM
Spoke with patient and notified Dr. Lares reviewed blood work and bmp stable and repeat cbc. Lab order submitted. Patient verbalize understanding.   Clear

## 2025-03-07 NOTE — ED PROVIDER NOTE - CARE PLAN
Interventional Radiology                     Discharge Instructions                           Following Tunneled Catheter Placement    Your site(s) has been closed with:     The Catheter is sutured  to skin at  insertion site    Derma Peña (Skin Glue) on neck incision    Do not apply any ointments over site    This thin layer will slough off in 7-10 days               May gently remove Derma Peña in 10 days if still present         Tunneled catheter is always covered with a Sterile Transparent dressing    Keep site clean and dry     Cover with an occlusive dressing for showering    Weekly transparent dressing changes or when it becomes wet or dirty     If there is any oozing or bleeding from the site, apply direct pressure for 5-10 minutes with a gauze pad.  If bleeding continues after 10 minutes call your primary doctor.  If bleeding cannot be controlled with direct pressure, call 911.    Call your Doctor if:    Bleeding as above    Swelling in your neck or arm    Sudden onset of shortness of breath, lightheadedness, or heart palpitations..    Fever greater than 100.5  F    Other signs of infection such as, redness, tenderness, or drainage from the wound.    If you were given sedation:    We recommend an adult stay with you for the first 24 hours.    No driving or alcoholic beverages for 24 hours.    ADDITIONAL INSTRUCTIONS:         No heavy lifting greater than 10 lbs for 3 days.           May use ice pack for pain or minor swelling for 15 min 3-4 times per day.    [facility] Interventional Radiology Department    Physician: Linda COLILNS  Date:May 7, 2021  Telephone Numbers:  [phone].....8 am to 4:30 pm   Monday-Friday  Hospital : [phone]....After hours, Weekends, & Holidays.  Ask for the Interventional Radiologist on call.  Someone is on call 24 hours a day.   Emergency Department:  [phone]                                                                                         Principal Discharge DX:	Acute UTI   1

## 2025-03-07 NOTE — ED PROVIDER NOTE - WR ORDER NAME 1
COVID since 12/9/21, SYLVESTER since diagnosis but worse within the past couple of hours. Patient is lightheaded with a strong non productive cough. Pain in chest from coughing.
Xray Chest 1 View- PORTABLE-Urgent

## 2025-03-07 NOTE — ED PROVIDER NOTE - OBJECTIVE STATEMENT
44F with PMh of HTN, GERD who presents with multiple symptoms. she reports a few days of intermittent blurry vision, mild headache, body aches and fatigue. today she was travelling around the city feeling like she might collapse due to feeling weak and lightheaded. she went to an UC and was found to have UTI but not given abx. she also mentions seeing her BP being slightly high today as well. denies trauma, no chest pain or vomiting

## 2025-03-07 NOTE — ED PROVIDER NOTE - CLINICAL SUMMARY MEDICAL DECISION MAKING FREE TEXT BOX
44F with PMh of HTN, GERD, hypothryoid who presents with multiple symptoms. she reports a few days of intermittent blurry vision, mild headache, body aches and fatigue. today she was travelling around the city feeling like she might collapse due to feeling weak and lightheaded. she went to an UC and was found to have UTI but not given abx. she also mentions seeing her BP being slightly high today as well. denies trauma, no chest pain or vomiting    plan for labs, medications, IVF, urine, reassess 44F with PMh of HTN, GERD, hypothryoid who presents with multiple symptoms. she reports a few days of intermittent blurry vision, mild headache, body aches and fatigue. today she was travelling around the city feeling like she might collapse due to feeling weak and lightheaded. she went to an UC and was found to have UTI but not given abx. she also mentions seeing her BP being slightly high today as well. denies trauma, no chest pain or vomiting    plan for labs, medications, IVF, urine, reassess    Patient was re-evaluated at this time and she remains without acute distress. UA shows likely UTI, will send her home with abx and given strict return precautions. The Patient will be discharged home at this time. Their lab and/or imaging results were explained with the patient and they were instructed to go over them with their PCP. All questions were answered at this time.    Patient was told to follow up with their PCP within the next 2 days. they were told to return to the ED if they have worsening pain, vomiting, fevers    patient will be discharged home at this time, they are in agreement with the plan

## 2025-03-07 NOTE — ED PROVIDER NOTE - INTERPRETATION
Addended by: TERESA SALGADO on: 5/16/2024 10:59 AM     Modules accepted: Orders     normal sinus rhythm

## 2025-03-07 NOTE — ED PROVIDER NOTE - NSFOLLOWUPINSTRUCTIONS_ED_ALL_ED_FT
Please return to the Emergency Department immediately for worsening pain, vomiting, fevers and if you have any other problems or concerns. Please follow up with your Primary Care Physician within the next 2 days.    Please take any prescription medications prescribed as instructed    Please follow up with PCP within the next 2 days as well      URINARY TRACT INFECTION IN WOMEN - Ambulatory Care    Urinary Tract Infection in Women    AMBULATORY CARE:    A urinary tract infection (UTI) is caused by bacteria that get inside your urinary tract. Your urinary tract includes your kidneys, ureters, bladder, and urethra. A UTI is more common in your lower urinary tract, which includes your bladder and urethra.  Female Urinary System    Common symptoms include the following:    Urinating more often or waking from sleep to urinate    Pain or burning when you urinate    Pain or pressure in your lower abdomen and back    Urine that smells bad    Blood in your urine    Leaking urine  Seek care immediately if:    You are urinating very little or not at all.    You have a high fever with shaking chills.    You have side or back pain that gets worse.  Call your doctor if:    You have a fever.    You do not feel better after 2 days of taking antibiotics.    You have new symptoms, such as blood or pus in your urine.    You are vomiting.    You have questions or concerns about your condition or care.  Treatment for a UTI may include antibiotics to treat a bacterial infection. You may also need medicines to decrease pain and burning, or decrease the urge to urinate often. If you have UTIs often (called recurrent UTIs), you may be given antibiotics to take regularly. You will be given directions for when and how to use antibiotics. The goal is to prevent UTIs but not cause antibiotic resistance by using antibiotics too often.    Prevent a UTI:    Empty your bladder often. Urinate and empty your bladder as soon as you feel the need. Do not hold your urine for long periods of time.    Wipe from front to back after you urinate or have a bowel movement. This will help prevent germs from getting into your urinary tract through your urethra.    Drink liquids as directed. Ask how much liquid to drink each day and which liquids are best for you. You may need to drink more liquids than usual to help flush out the bacteria. Do not drink alcohol, caffeine, or citrus juices. These can irritate your bladder and increase your symptoms. Your healthcare provider may recommend cranberry juice to help prevent a UTI.    Urinate before and after you have sex. This can help flush out bacteria passed during sex.    Do not douche or use feminine deodorants. These can change the chemical balance in your vagina.    Change sanitary pads or tampons often. This will help prevent germs from getting into your urinary tract.    Talk to your healthcare provider about your birth control method. You may need to change your method if it is increasing your risk for UTIs.    Wear cotton underwear and clothes that are loose. Tight pants and nylon underwear can trap moisture and cause bacteria to grow.    Vaginal estrogen may be recommended. This medicine helps prevent UTIs in women who have gone through menopause or are in judi-menopause.    Do pelvic muscle exercises often. Pelvic muscle exercises may help you start and stop urinating. Strong pelvic muscles may help you empty your bladder easier. Squeeze these muscles tightly for 5 seconds like you are trying to hold back urine. Then relax for 5 seconds. Gradually work up to squeezing for 10 seconds. Do 3 sets of 15 repetitions a day, or as directed.  Follow up with your doctor as directed: Write down your questions so you remember to ask them during your visits.

## 2025-03-07 NOTE — ED PROVIDER NOTE - PATIENT PORTAL LINK FT
You can access the FollowMyHealth Patient Portal offered by [facility] by registering at the following website: [email or web address]. By joining [employer]’s FollowMyHealth portal, you will also be able to view your health information using other applications (apps) compatible with our system.

## 2025-03-08 LAB
FLUAV AG NPH QL: SIGNIFICANT CHANGE UP
FLUBV AG NPH QL: SIGNIFICANT CHANGE UP
RSV RNA NPH QL NAA+NON-PROBE: SIGNIFICANT CHANGE UP
SARS-COV-2 RNA SPEC QL NAA+PROBE: SIGNIFICANT CHANGE UP

## 2025-03-08 RX ORDER — CEFUROXIME SODIUM 1.5 G
1 VIAL (EA) INJECTION
Qty: 14 | Refills: 0
Start: 2025-03-08

## 2025-03-08 RX ADMIN — CEFTRIAXONE 100 MILLIGRAM(S): 500 INJECTION, POWDER, FOR SOLUTION INTRAMUSCULAR; INTRAVENOUS at 00:13

## 2025-03-09 LAB
CULTURE RESULTS: SIGNIFICANT CHANGE UP
SPECIMEN SOURCE: SIGNIFICANT CHANGE UP

## 2025-03-11 ENCOUNTER — EMERGENCY (EMERGENCY)
Facility: HOSPITAL | Age: 45
LOS: 1 days | Discharge: HOME | End: 2025-03-11
Attending: EMERGENCY MEDICINE | Admitting: EMERGENCY MEDICINE
Payer: COMMERCIAL

## 2025-03-11 VITALS
HEIGHT: 63 IN | SYSTOLIC BLOOD PRESSURE: 155 MMHG | RESPIRATION RATE: 18 BRPM | OXYGEN SATURATION: 99 % | HEART RATE: 94 BPM | WEIGHT: 177.91 LBS | TEMPERATURE: 99 F | DIASTOLIC BLOOD PRESSURE: 106 MMHG

## 2025-03-11 VITALS
OXYGEN SATURATION: 96 % | SYSTOLIC BLOOD PRESSURE: 132 MMHG | RESPIRATION RATE: 18 BRPM | TEMPERATURE: 98 F | DIASTOLIC BLOOD PRESSURE: 89 MMHG | HEART RATE: 76 BPM

## 2025-03-11 LAB
ALBUMIN SERPL ELPH-MCNC: 3.9 G/DL — SIGNIFICANT CHANGE UP (ref 3.3–5)
ALP SERPL-CCNC: 81 U/L — SIGNIFICANT CHANGE UP (ref 40–120)
ALT FLD-CCNC: 40 U/L — SIGNIFICANT CHANGE UP (ref 12–78)
ANION GAP SERPL CALC-SCNC: 6 MMOL/L — SIGNIFICANT CHANGE UP (ref 5–17)
APPEARANCE UR: CLEAR — SIGNIFICANT CHANGE UP
AST SERPL-CCNC: 20 U/L — SIGNIFICANT CHANGE UP (ref 15–37)
BASOPHILS # BLD AUTO: 0.13 K/UL — SIGNIFICANT CHANGE UP (ref 0–0.2)
BASOPHILS NFR BLD AUTO: 1.4 % — SIGNIFICANT CHANGE UP (ref 0–2)
BILIRUB SERPL-MCNC: 0.2 MG/DL — SIGNIFICANT CHANGE UP (ref 0.2–1.2)
BILIRUB UR-MCNC: NEGATIVE — SIGNIFICANT CHANGE UP
BUN SERPL-MCNC: 11 MG/DL — SIGNIFICANT CHANGE UP (ref 7–23)
CALCIUM SERPL-MCNC: 9.6 MG/DL — SIGNIFICANT CHANGE UP (ref 8.5–10.1)
CHLORIDE SERPL-SCNC: 104 MMOL/L — SIGNIFICANT CHANGE UP (ref 96–108)
CO2 SERPL-SCNC: 29 MMOL/L — SIGNIFICANT CHANGE UP (ref 22–31)
COLOR SPEC: YELLOW — SIGNIFICANT CHANGE UP
CREAT SERPL-MCNC: 0.59 MG/DL — SIGNIFICANT CHANGE UP (ref 0.5–1.3)
DIFF PNL FLD: NEGATIVE — SIGNIFICANT CHANGE UP
EGFR: 115 ML/MIN/1.73M2 — SIGNIFICANT CHANGE UP
EOSINOPHIL # BLD AUTO: 0.27 K/UL — SIGNIFICANT CHANGE UP (ref 0–0.5)
EOSINOPHIL NFR BLD AUTO: 2.8 % — SIGNIFICANT CHANGE UP (ref 0–6)
FLUAV AG NPH QL: SIGNIFICANT CHANGE UP
FLUBV AG NPH QL: SIGNIFICANT CHANGE UP
GLUCOSE SERPL-MCNC: 133 MG/DL — HIGH (ref 70–99)
GLUCOSE UR QL: NEGATIVE MG/DL — SIGNIFICANT CHANGE UP
HCG SERPL-ACNC: <1 MIU/ML — SIGNIFICANT CHANGE UP
HCT VFR BLD CALC: 41.4 % — SIGNIFICANT CHANGE UP (ref 34.5–45)
HGB BLD-MCNC: 13.8 G/DL — SIGNIFICANT CHANGE UP (ref 11.5–15.5)
IMM GRANULOCYTES NFR BLD AUTO: 0.8 % — SIGNIFICANT CHANGE UP (ref 0–0.9)
KETONES UR-MCNC: NEGATIVE MG/DL — SIGNIFICANT CHANGE UP
LEUKOCYTE ESTERASE UR-ACNC: NEGATIVE — SIGNIFICANT CHANGE UP
LIDOCAIN IGE QN: 45 U/L — SIGNIFICANT CHANGE UP (ref 13–75)
LYMPHOCYTES # BLD AUTO: 2.46 K/UL — SIGNIFICANT CHANGE UP (ref 1–3.3)
LYMPHOCYTES # BLD AUTO: 25.7 % — SIGNIFICANT CHANGE UP (ref 13–44)
MCHC RBC-ENTMCNC: 26.2 PG — LOW (ref 27–34)
MCHC RBC-ENTMCNC: 33.3 G/DL — SIGNIFICANT CHANGE UP (ref 32–36)
MCV RBC AUTO: 78.6 FL — LOW (ref 80–100)
MONOCYTES # BLD AUTO: 0.62 K/UL — SIGNIFICANT CHANGE UP (ref 0–0.9)
MONOCYTES NFR BLD AUTO: 6.5 % — SIGNIFICANT CHANGE UP (ref 2–14)
NEUTROPHILS # BLD AUTO: 6.01 K/UL — SIGNIFICANT CHANGE UP (ref 1.8–7.4)
NEUTROPHILS NFR BLD AUTO: 62.8 % — SIGNIFICANT CHANGE UP (ref 43–77)
NITRITE UR-MCNC: NEGATIVE — SIGNIFICANT CHANGE UP
NRBC BLD AUTO-RTO: 0 /100 WBCS — SIGNIFICANT CHANGE UP (ref 0–0)
PH UR: 6.5 — SIGNIFICANT CHANGE UP (ref 5–8)
PLATELET # BLD AUTO: 283 K/UL — SIGNIFICANT CHANGE UP (ref 150–400)
POTASSIUM SERPL-MCNC: 4 MMOL/L — SIGNIFICANT CHANGE UP (ref 3.5–5.3)
POTASSIUM SERPL-SCNC: 4 MMOL/L — SIGNIFICANT CHANGE UP (ref 3.5–5.3)
PROT SERPL-MCNC: 7.8 G/DL — SIGNIFICANT CHANGE UP (ref 6–8.3)
PROT UR-MCNC: NEGATIVE MG/DL — SIGNIFICANT CHANGE UP
RBC # BLD: 5.27 M/UL — HIGH (ref 3.8–5.2)
RBC # FLD: 13.2 % — SIGNIFICANT CHANGE UP (ref 10.3–14.5)
RSV RNA NPH QL NAA+NON-PROBE: SIGNIFICANT CHANGE UP
SARS-COV-2 RNA SPEC QL NAA+PROBE: SIGNIFICANT CHANGE UP
SODIUM SERPL-SCNC: 139 MMOL/L — SIGNIFICANT CHANGE UP (ref 135–145)
SP GR SPEC: 1 — LOW (ref 1–1.03)
TSH SERPL-MCNC: 2.26 UIU/ML — SIGNIFICANT CHANGE UP (ref 0.36–3.74)
UROBILINOGEN FLD QL: 0.2 MG/DL — SIGNIFICANT CHANGE UP (ref 0.2–1)
WBC # BLD: 9.57 K/UL — SIGNIFICANT CHANGE UP (ref 3.8–10.5)
WBC # FLD AUTO: 9.57 K/UL — SIGNIFICANT CHANGE UP (ref 3.8–10.5)

## 2025-03-11 RX ORDER — METOCLOPRAMIDE HCL 10 MG
10 TABLET ORAL ONCE
Refills: 0 | Status: COMPLETED | OUTPATIENT
Start: 2025-03-11 | End: 2025-03-11

## 2025-03-11 RX ORDER — ACETAMINOPHEN 500 MG/5ML
1000 LIQUID (ML) ORAL ONCE
Refills: 0 | Status: COMPLETED | OUTPATIENT
Start: 2025-03-11 | End: 2025-03-11

## 2025-03-11 RX ORDER — KETOROLAC TROMETHAMINE 30 MG/ML
15 INJECTION, SOLUTION INTRAMUSCULAR; INTRAVENOUS ONCE
Refills: 0 | Status: DISCONTINUED | OUTPATIENT
Start: 2025-03-11 | End: 2025-03-11

## 2025-03-11 RX ORDER — ONDANSETRON HCL/PF 4 MG/2 ML
1 VIAL (ML) INJECTION
Qty: 1 | Refills: 0
Start: 2025-03-11 | End: 2025-03-13

## 2025-03-11 RX ADMIN — Medication 400 MILLIGRAM(S): at 22:08

## 2025-03-11 RX ADMIN — Medication 10 MILLIGRAM(S): at 18:53

## 2025-03-11 RX ADMIN — Medication 1000 MILLILITER(S): at 18:53

## 2025-03-11 RX ADMIN — KETOROLAC TROMETHAMINE 15 MILLIGRAM(S): 30 INJECTION, SOLUTION INTRAMUSCULAR; INTRAVENOUS at 19:46

## 2025-03-11 NOTE — ED ADULT TRIAGE NOTE - CHIEF COMPLAINT QUOTE
A&Ox4 seen in ED on Friday dx with UTI discharged on PO antibiotics but reports sx worsening. has nausea/vomiting/headache

## 2025-03-11 NOTE — ED PROVIDER NOTE - CARE PROVIDER_API CALL
Lindsey Grijalva  Neurology  [address] [place] [address]  Phone: [phone]  Fax: [phone]  Follow Up Time: 1-3 Days    Pedro Garcia  Otolaryngology  [address]  [place] [address]  Phone: [phone]  Fax: [phone]  Follow Up Time: 1-3 Days

## 2025-03-11 NOTE — ED ADULT NURSE NOTE - OBJECTIVE STATEMENT
Pt received alert and oriented and resting in bed with the c/o flu-like symptoms and urinary symptoms while taking antibiotics for a UTI.  As per Md's orders IV nirmala placed blood specimen obtained and sent to the lab. Pt stable and meds given and tolerated well. Nursing care ongoing and safety maintained

## 2025-03-11 NOTE — ED PROVIDER NOTE - PATIENT PORTAL LINK FT
You can access the FollowMyHealth Patient Portal offered by [facility] by registering at the following website: [email or web address]. By joining [employer]’s FollowMyHealth portal, you will also be able to view your health information using other applications (apps) compatible with our system.

## 2025-03-11 NOTE — ED ADULT NURSE NOTE - NSFALLUNIVINTERV_ED_ALL_ED
Bed/Stretcher in lowest position, wheels locked, appropriate side rails in place/Call bell, personal items and telephone in reach/Instruct patient to call for assistance before getting out of bed/chair/stretcher/Non-slip footwear applied when patient is off stretcher/[place] to call system/Physically safe environment - no spills, clutter or unnecessary equipment/Purposeful proactive rounding/Room/bathroom lighting operational, light cord in reach

## 2025-03-11 NOTE — ED PROVIDER NOTE - CLINICAL SUMMARY MEDICAL DECISION MAKING FREE TEXT BOX
44 female PMH of hypothyroid, HTN, GERD presents to the emergency department states that she was here on 3/7/2025 in which she had nausea vomiting, diagnosed with UTI, sent home with cefuroxime, upon review of urine culture shows contaminated specimen, patient states that she has been taking cefuroxime, still complaining of nausea, vomiting, left flank pain, headache, patient awake and alert, PE PERRL, EOMI, no facial droop, no slurred speech, abdomen soft, left CVA tenderness, follow-up CT head, CT abdomen pelvis, CBC, CMP, urine analysis, IV fluids, IV Reglan, obtain rectal temperature and reevaluate.

## 2025-03-11 NOTE — ED ADULT NURSE REASSESSMENT NOTE - NS ED NURSE REASSESS COMMENT FT1
Assumed care of patient from Wendy, RN and pt laying on stretcher quietly, no acute distress noted at this time and family at bedside. patient pending CT scan.

## 2025-03-11 NOTE — ED ADULT TRIAGE NOTE - PAIN RATING/NUMBER SCALE (0-10): ACTIVITY
Additional Notes: Patient refused
Detail Level: Detailed
Quality 110: Preventive Care And Screening: Influenza Immunization: Influenza Immunization not Administered because Patient Refused.
10 (severe pain)

## 2025-03-11 NOTE — ED PROVIDER NOTE - PROVIDER TOKENS
PROVIDER:[TOKEN:[[id number]],FOLLOWUP:[1-3 Days]],PROVIDER:[TOKEN:[[id number]],FOLLOWUP:[1-3 Days]]

## 2025-03-11 NOTE — ED PROVIDER NOTE - CARE PROVIDERS DIRECT ADDRESSES
,DirectAddress_Unknown,[email or web address]

## 2025-03-11 NOTE — ED PROVIDER NOTE - OBJECTIVE STATEMENT
44-year-old female with history of hypertension, GERD presents to the ED complaining of headache, nausea, vomiting, left flank pain.  Patient states she has not been feeling well for the past 1 week.  Patient came to ED 3 days ago, found to have a UTI, DC'd with cefuroxime.  Urine culture at that time shows contaminated specimen.  Patient states that she still has not been feeling well.  Patient saw her primary care doctor today who referred her to neurologist.  Patient denies visual yesterday, states evaluation was unremarkable.  Denies patient with intermittent blurry vision, saw ophthalmology.  patient states she is having some chills.  Denies fever, chest pain, shortness of breath, neck pain/stiffness, increased frequency, hematuria.  Patient states she has been having dysuria.

## 2025-03-11 NOTE — ED PROVIDER NOTE - DIFFERENTIAL DIAGNOSIS
Pyelonephritis, UTI, kidney stone, electrolyte imbalance, intracranial pathology Differential Diagnosis